# Patient Record
Sex: MALE | Race: WHITE | Employment: OTHER | URBAN - METROPOLITAN AREA
[De-identification: names, ages, dates, MRNs, and addresses within clinical notes are randomized per-mention and may not be internally consistent; named-entity substitution may affect disease eponyms.]

---

## 2017-02-15 PROBLEM — E78.2 MIXED HYPERLIPIDEMIA: Status: ACTIVE | Noted: 2017-02-15

## 2017-02-15 PROBLEM — I10 BENIGN ESSENTIAL HTN: Status: ACTIVE | Noted: 2017-02-15

## 2017-02-15 PROBLEM — Z79.4 CONTROLLED TYPE 2 DIABETES MELLITUS WITH COMPLICATION, WITH LONG-TERM CURRENT USE OF INSULIN (HCC): Status: ACTIVE | Noted: 2017-02-15

## 2017-02-15 PROBLEM — Z95.1 S/P CABG (CORONARY ARTERY BYPASS GRAFT): Status: ACTIVE | Noted: 2017-02-15

## 2017-02-15 PROBLEM — N18.30 CKD (CHRONIC KIDNEY DISEASE), STAGE III (HCC): Status: ACTIVE | Noted: 2017-02-15

## 2017-02-15 PROBLEM — I25.10 CORONARY ARTERY DISEASE INVOLVING NATIVE CORONARY ARTERY WITHOUT ANGINA PECTORIS: Status: ACTIVE | Noted: 2017-02-15

## 2017-02-15 PROBLEM — E11.8 CONTROLLED TYPE 2 DIABETES MELLITUS WITH COMPLICATION, WITH LONG-TERM CURRENT USE OF INSULIN (HCC): Status: ACTIVE | Noted: 2017-02-15

## 2017-03-01 ENCOUNTER — OFFICE VISIT (OUTPATIENT)
Dept: PULMONOLOGY | Age: 77
End: 2017-03-01

## 2017-03-01 VITALS
WEIGHT: 192.4 LBS | HEIGHT: 73 IN | RESPIRATION RATE: 18 BRPM | HEART RATE: 64 BPM | SYSTOLIC BLOOD PRESSURE: 132 MMHG | DIASTOLIC BLOOD PRESSURE: 74 MMHG | TEMPERATURE: 97.9 F | OXYGEN SATURATION: 91 % | BODY MASS INDEX: 25.5 KG/M2

## 2017-03-01 DIAGNOSIS — J98.11 ATELECTASIS: Primary | ICD-10-CM

## 2017-03-01 DIAGNOSIS — R93.89 ABNORMAL CHEST X-RAY: ICD-10-CM

## 2017-03-01 PROCEDURE — 1123F ACP DISCUSS/DSCN MKR DOCD: CPT | Performed by: NURSE PRACTITIONER

## 2017-03-01 PROCEDURE — 1036F TOBACCO NON-USER: CPT | Performed by: NURSE PRACTITIONER

## 2017-03-01 PROCEDURE — G8598 ASA/ANTIPLAT THER USED: HCPCS | Performed by: NURSE PRACTITIONER

## 2017-03-01 PROCEDURE — G8484 FLU IMMUNIZE NO ADMIN: HCPCS | Performed by: NURSE PRACTITIONER

## 2017-03-01 PROCEDURE — 99212 OFFICE O/P EST SF 10 MIN: CPT | Performed by: NURSE PRACTITIONER

## 2017-03-01 PROCEDURE — 1111F DSCHRG MED/CURRENT MED MERGE: CPT | Performed by: NURSE PRACTITIONER

## 2017-03-01 PROCEDURE — G8420 CALC BMI NORM PARAMETERS: HCPCS | Performed by: NURSE PRACTITIONER

## 2017-03-01 PROCEDURE — G8427 DOCREV CUR MEDS BY ELIG CLIN: HCPCS | Performed by: NURSE PRACTITIONER

## 2017-03-01 PROCEDURE — 4040F PNEUMOC VAC/ADMIN/RCVD: CPT | Performed by: NURSE PRACTITIONER

## 2017-03-16 ENCOUNTER — HOSPITAL ENCOUNTER (OUTPATIENT)
Dept: OTHER | Age: 77
Discharge: HOME | End: 2017-03-16
Attending: NURSE PRACTITIONER | Admitting: NURSE PRACTITIONER

## 2017-03-16 DIAGNOSIS — J98.11 ATELECTASIS: ICD-10-CM

## 2017-03-27 ENCOUNTER — TELEPHONE (OUTPATIENT)
Dept: PULMONOLOGY | Age: 77
End: 2017-03-27

## 2017-03-27 DIAGNOSIS — J98.11 ATELECTASIS: Primary | ICD-10-CM

## 2017-03-30 PROBLEM — R07.9 CHEST PAIN: Status: ACTIVE | Noted: 2017-03-30

## 2017-03-30 PROBLEM — R06.02 SOB (SHORTNESS OF BREATH): Status: ACTIVE | Noted: 2017-03-30

## 2017-04-03 PROBLEM — I26.99 BILATERAL PULMONARY EMBOLISM (HCC): Status: ACTIVE | Noted: 2017-04-03

## 2017-04-18 ENCOUNTER — OFFICE VISIT (OUTPATIENT)
Dept: PULMONOLOGY | Age: 77
End: 2017-04-18

## 2017-04-18 VITALS
SYSTOLIC BLOOD PRESSURE: 128 MMHG | TEMPERATURE: 97.5 F | OXYGEN SATURATION: 92 % | BODY MASS INDEX: 25.05 KG/M2 | DIASTOLIC BLOOD PRESSURE: 62 MMHG | RESPIRATION RATE: 16 BRPM | HEIGHT: 73 IN | HEART RATE: 70 BPM | WEIGHT: 189 LBS

## 2017-04-18 DIAGNOSIS — J96.11 CHRONIC RESPIRATORY FAILURE WITH HYPOXIA (HCC): ICD-10-CM

## 2017-04-18 DIAGNOSIS — J98.11 ATELECTASIS: Primary | ICD-10-CM

## 2017-04-18 DIAGNOSIS — R93.89 ABNORMAL CHEST X-RAY: ICD-10-CM

## 2017-04-18 DIAGNOSIS — I26.99 OTHER ACUTE PULMONARY EMBOLISM WITHOUT ACUTE COR PULMONALE (HCC): ICD-10-CM

## 2017-04-18 PROCEDURE — G8427 DOCREV CUR MEDS BY ELIG CLIN: HCPCS | Performed by: NURSE PRACTITIONER

## 2017-04-18 PROCEDURE — 1123F ACP DISCUSS/DSCN MKR DOCD: CPT | Performed by: NURSE PRACTITIONER

## 2017-04-18 PROCEDURE — 1036F TOBACCO NON-USER: CPT | Performed by: NURSE PRACTITIONER

## 2017-04-18 PROCEDURE — 1111F DSCHRG MED/CURRENT MED MERGE: CPT | Performed by: NURSE PRACTITIONER

## 2017-04-18 PROCEDURE — G8420 CALC BMI NORM PARAMETERS: HCPCS | Performed by: NURSE PRACTITIONER

## 2017-04-18 PROCEDURE — 4040F PNEUMOC VAC/ADMIN/RCVD: CPT | Performed by: NURSE PRACTITIONER

## 2017-04-18 PROCEDURE — G8598 ASA/ANTIPLAT THER USED: HCPCS | Performed by: NURSE PRACTITIONER

## 2017-04-18 PROCEDURE — 99214 OFFICE O/P EST MOD 30 MIN: CPT | Performed by: NURSE PRACTITIONER

## 2017-04-25 ENCOUNTER — HOSPITAL ENCOUNTER (OUTPATIENT)
Dept: CT IMAGING | Age: 77
Discharge: HOME | End: 2017-04-25
Attending: NURSE PRACTITIONER | Admitting: NURSE PRACTITIONER

## 2017-04-25 ENCOUNTER — HOSPITAL ENCOUNTER (OUTPATIENT)
Dept: CARDIAC REHAB | Age: 77
Discharge: HOME | End: 2017-04-25
Attending: INTERNAL MEDICINE | Admitting: INTERNAL MEDICINE

## 2017-04-25 DIAGNOSIS — J98.11 ATELECTASIS: ICD-10-CM

## 2017-04-25 DIAGNOSIS — J96.11 CHRONIC RESPIRATORY FAILURE WITH HYPOXIA (HCC): ICD-10-CM

## 2017-04-25 PROCEDURE — 94620 PR PULMONARY STRESS TESTING,SIMPLE: CPT | Performed by: INTERNAL MEDICINE

## 2017-04-26 ENCOUNTER — TELEPHONE (OUTPATIENT)
Dept: PULMONOLOGY | Age: 77
End: 2017-04-26

## 2017-04-26 PROBLEM — J96.11 CHRONIC RESPIRATORY FAILURE WITH HYPOXIA (HCC): Status: ACTIVE | Noted: 2017-04-26

## 2017-06-09 ENCOUNTER — TELEPHONE (OUTPATIENT)
Dept: PULMONOLOGY | Age: 77
End: 2017-06-09

## 2017-06-15 ENCOUNTER — OFFICE VISIT (OUTPATIENT)
Dept: PULMONOLOGY | Age: 77
End: 2017-06-15

## 2017-06-15 VITALS
HEIGHT: 73 IN | BODY MASS INDEX: 25.45 KG/M2 | OXYGEN SATURATION: 93 % | DIASTOLIC BLOOD PRESSURE: 62 MMHG | RESPIRATION RATE: 16 BRPM | TEMPERATURE: 97.6 F | SYSTOLIC BLOOD PRESSURE: 122 MMHG | WEIGHT: 192 LBS | HEART RATE: 54 BPM

## 2017-06-15 DIAGNOSIS — R91.8 LUNG NODULES: ICD-10-CM

## 2017-06-15 DIAGNOSIS — I26.99 OTHER ACUTE PULMONARY EMBOLISM WITHOUT ACUTE COR PULMONALE (HCC): Primary | ICD-10-CM

## 2017-06-15 DIAGNOSIS — J43.2 CENTRILOBULAR EMPHYSEMA (HCC): ICD-10-CM

## 2017-06-15 PROCEDURE — 99214 OFFICE O/P EST MOD 30 MIN: CPT | Performed by: INTERNAL MEDICINE

## 2017-06-15 PROCEDURE — 4040F PNEUMOC VAC/ADMIN/RCVD: CPT | Performed by: INTERNAL MEDICINE

## 2017-06-15 PROCEDURE — 1123F ACP DISCUSS/DSCN MKR DOCD: CPT | Performed by: INTERNAL MEDICINE

## 2017-06-15 PROCEDURE — G8598 ASA/ANTIPLAT THER USED: HCPCS | Performed by: INTERNAL MEDICINE

## 2017-06-15 PROCEDURE — 3023F SPIROM DOC REV: CPT | Performed by: INTERNAL MEDICINE

## 2017-06-15 PROCEDURE — G8427 DOCREV CUR MEDS BY ELIG CLIN: HCPCS | Performed by: INTERNAL MEDICINE

## 2017-06-15 PROCEDURE — 1036F TOBACCO NON-USER: CPT | Performed by: INTERNAL MEDICINE

## 2017-06-15 PROCEDURE — G8419 CALC BMI OUT NRM PARAM NOF/U: HCPCS | Performed by: INTERNAL MEDICINE

## 2017-06-15 PROCEDURE — G8926 SPIRO NO PERF OR DOC: HCPCS | Performed by: INTERNAL MEDICINE

## 2017-12-19 ENCOUNTER — OFFICE VISIT (OUTPATIENT)
Dept: PULMONOLOGY | Age: 77
End: 2017-12-19

## 2017-12-19 VITALS
HEART RATE: 64 BPM | OXYGEN SATURATION: 94 % | DIASTOLIC BLOOD PRESSURE: 62 MMHG | RESPIRATION RATE: 16 BRPM | HEIGHT: 73 IN | TEMPERATURE: 97.4 F | SYSTOLIC BLOOD PRESSURE: 130 MMHG | WEIGHT: 195 LBS | BODY MASS INDEX: 25.84 KG/M2

## 2017-12-19 DIAGNOSIS — I26.99 OTHER ACUTE PULMONARY EMBOLISM WITHOUT ACUTE COR PULMONALE (HCC): ICD-10-CM

## 2017-12-19 DIAGNOSIS — J43.2 CENTRILOBULAR EMPHYSEMA (HCC): Primary | ICD-10-CM

## 2017-12-19 DIAGNOSIS — R91.8 LUNG NODULES: ICD-10-CM

## 2017-12-19 PROCEDURE — 3023F SPIROM DOC REV: CPT | Performed by: INTERNAL MEDICINE

## 2017-12-19 PROCEDURE — 4040F PNEUMOC VAC/ADMIN/RCVD: CPT | Performed by: INTERNAL MEDICINE

## 2017-12-19 PROCEDURE — G8598 ASA/ANTIPLAT THER USED: HCPCS | Performed by: INTERNAL MEDICINE

## 2017-12-19 PROCEDURE — G8926 SPIRO NO PERF OR DOC: HCPCS | Performed by: INTERNAL MEDICINE

## 2017-12-19 PROCEDURE — 99213 OFFICE O/P EST LOW 20 MIN: CPT | Performed by: INTERNAL MEDICINE

## 2017-12-19 PROCEDURE — 1123F ACP DISCUSS/DSCN MKR DOCD: CPT | Performed by: INTERNAL MEDICINE

## 2017-12-19 PROCEDURE — G8427 DOCREV CUR MEDS BY ELIG CLIN: HCPCS | Performed by: INTERNAL MEDICINE

## 2017-12-19 PROCEDURE — G8484 FLU IMMUNIZE NO ADMIN: HCPCS | Performed by: INTERNAL MEDICINE

## 2017-12-19 PROCEDURE — 1036F TOBACCO NON-USER: CPT | Performed by: INTERNAL MEDICINE

## 2017-12-19 PROCEDURE — G8417 CALC BMI ABV UP PARAM F/U: HCPCS | Performed by: INTERNAL MEDICINE

## 2017-12-19 RX ORDER — ALBUTEROL SULFATE 90 UG/1
2 AEROSOL, METERED RESPIRATORY (INHALATION) EVERY 6 HOURS PRN
Qty: 1 INHALER | Refills: 0 | COMMUNITY
Start: 2017-12-19

## 2017-12-19 NOTE — PROGRESS NOTES
Chief complaint  This is a 68y.o. year old male  who presents with a chief complaint of   Chief Complaint   Patient presents with    COPD       HPI  66-year-old man with emphysema and pulmonary emboli presents for follow-up. He reports that [place] He has some mild shortness of breath with this, but this does not limit him. He also does the yard work. He has a rare cough. He used a sample of Anoro, but did not feel that it made any difference in his breathing.     Past Medical History:   Diagnosis Date    Asthma     Diabetes mellitus ([address]     Hyperlipidemia     Hypertension        Past Surgical History:   Procedure Laterality Date    ARTERIAL BYPASS SURGRY  1999    CORONARY ARTERY BYPASS GRAFT      EYE SURGERY         Current Outpatient Prescriptions   Medication Sig Dispense Refill    albuterol sulfate HFA (PROAIR HFA) 108 (90 Base) MCG/ACT inhaler Inhale 2 puffs into the lungs every 6 hours as needed for Wheezing 1 Inhaler 0    apixaban (ELIQUIS) 5 MG TABS tablet 10mg bid x 6 days, then 5mg bid - future refills of 60 tabs 72 tablet 3    acetaminophen (TYLENOL) 325 MG tablet Take 2 tablets by mouth every 4 hours as needed for Pain or Fever 120 tablet 3    atorvastatin (LIPITOR) 40 MG tablet 1 tablet daily      diltiazem (TIAZAC) 360 MG extended release capsule Take 360 mg by mouth daily       doxazosin (CARDURA) 1 MG tablet Take 1 mg by mouth daily       fenofibrate micronized (LOFIBRA) 134 MG capsule Take 134 mg by mouth daily       insulin detemir (LEVEMIR) 100 UNIT/ML injection vial Inject 30 Units into the skin daily       losartan-hydrochlorothiazide (HYZAAR) 100-12.5 MG per tablet Take 1 tablet by mouth daily       metFORMIN (GLUCOPHAGE) 500 MG tablet Take 500 mg by mouth 2 times daily (with meals)       montelukast (SINGULAIR) 10 MG tablet Take 10 mg by mouth daily       aspirin 81 MG chewable tablet Take 81 mg by mouth daily       Insulin Pen Needle ulcers on visible areas. Normal texture and turgor  Lymphatic: No cervical LAD. No supraclavicular LAD. Musculoskeletal: No cyanosis, clubbing or joint deformity. Psychiatric: Normal mood and affect; exhibits normal insight and judgement        Six minute walk test  4/25/17- Walked 427m, 76% predicted (normal); homer saturation 93%     Images and reports of chest imaging were reviewed by me. My interpretation is:  CXR (3/31): Left base atelectasis  Chest CT (4/25/17): Calcified left hilar node; bi-apical scarring; centrilobular emphysema; nodules in the right upper lobe; right upper lobe granulomas; bilateral lower lobe bronchiectasis; atelectasis in the bilateral lower lobes; granulomas in the left lower lobe and left upper lobe; left lower lobe pleural-based nodule        ECHO (12/4/17- [facility])  Study Conclusions    - Left ventricle: Systolic function was normal. Doppler parameters    are consistent with abnormal left ventricular relaxation (grade 1    diastolic dysfunction). The global longitudinal strain was -17%. - Aortic valve: Valve area (VTI): 1.7cm^2. Valve area (Vmax):    1.31cm^2. - Mitral valve: The annulus was mildly calcified. The leaflets were    mildly thickened. Valve area by continuity equation (using LVOT    flow): 1.7cm^2.  - Left atrium: The atrium was markedly dilated. - Right atrium: The atrium was dilated. Lab Results   Component Value Date    WBC 9.1 04/03/2017    HGB 14.1 04/03/2017    HCT 43.4 04/03/2017    MCV 88.7 04/03/2017     04/03/2017       No results found for: BNP    Lab Results   Component Value Date    CREATININE 1.7 (H) 04/03/2017    BUN 28 (H) 04/03/2017     04/03/2017    K 4.8 04/03/2017     04/03/2017    CO2 23 04/03/2017         Assessment/Plan:68y.o. year old male presents for follow up. COPD- Has emphysema on chest CT. He has no significant respiratory symptoms.  He was given a sample of an albuterol inhaler to be used as needed. Lung nodules- Has bilateral pulmonary nodules. Will repeat chest CT in April 2018 (one year follow up). Pulmonary emboli- On Eliquis per hematology. He will return for follow-up in 4 months.       Pipo Lynn MD  [facility]

## 2018-01-01 ENCOUNTER — OFFICE VISIT (OUTPATIENT)
Dept: PULMONOLOGY | Age: 78
End: 2018-01-01
Payer: MEDICARE

## 2018-01-01 VITALS
RESPIRATION RATE: 16 BRPM | OXYGEN SATURATION: 95 % | HEART RATE: 86 BPM | TEMPERATURE: 97.7 F | WEIGHT: 194 LBS | BODY MASS INDEX: 25.71 KG/M2 | SYSTOLIC BLOOD PRESSURE: 142 MMHG | DIASTOLIC BLOOD PRESSURE: 70 MMHG | HEIGHT: 73 IN

## 2018-01-01 DIAGNOSIS — R91.8 LUNG NODULES: Primary | ICD-10-CM

## 2018-01-01 DIAGNOSIS — J43.2 CENTRILOBULAR EMPHYSEMA (HCC): ICD-10-CM

## 2018-01-01 PROCEDURE — G8598 ASA/ANTIPLAT THER USED: HCPCS | Performed by: INTERNAL MEDICINE

## 2018-01-01 PROCEDURE — G8482 FLU IMMUNIZE ORDER/ADMIN: HCPCS | Performed by: INTERNAL MEDICINE

## 2018-01-01 PROCEDURE — 3023F SPIROM DOC REV: CPT | Performed by: INTERNAL MEDICINE

## 2018-01-01 PROCEDURE — 4040F PNEUMOC VAC/ADMIN/RCVD: CPT | Performed by: INTERNAL MEDICINE

## 2018-01-01 PROCEDURE — 1036F TOBACCO NON-USER: CPT | Performed by: INTERNAL MEDICINE

## 2018-01-01 PROCEDURE — 1101F PT FALLS ASSESS-DOCD LE1/YR: CPT | Performed by: INTERNAL MEDICINE

## 2018-01-01 PROCEDURE — 99213 OFFICE O/P EST LOW 20 MIN: CPT | Performed by: INTERNAL MEDICINE

## 2018-01-01 PROCEDURE — G8427 DOCREV CUR MEDS BY ELIG CLIN: HCPCS | Performed by: INTERNAL MEDICINE

## 2018-01-01 PROCEDURE — 1123F ACP DISCUSS/DSCN MKR DOCD: CPT | Performed by: INTERNAL MEDICINE

## 2018-01-01 PROCEDURE — G8926 SPIRO NO PERF OR DOC: HCPCS | Performed by: INTERNAL MEDICINE

## 2018-01-01 PROCEDURE — G8417 CALC BMI ABV UP PARAM F/U: HCPCS | Performed by: INTERNAL MEDICINE

## 2018-01-09 ENCOUNTER — HOSPITAL ENCOUNTER (OUTPATIENT)
Dept: CT IMAGING | Age: 78
Discharge: HOME | End: 2018-01-09
Attending: INTERNAL MEDICINE | Admitting: INTERNAL MEDICINE

## 2018-01-09 DIAGNOSIS — R91.8 LUNG NODULES: ICD-10-CM

## 2018-01-09 DIAGNOSIS — R91.8 OTHER NONSPECIFIC ABNORMAL FINDING OF LUNG FIELD (CODE): ICD-10-CM

## 2018-04-23 ENCOUNTER — OFFICE VISIT (OUTPATIENT)
Dept: PULMONOLOGY | Age: 78
End: 2018-04-23

## 2018-04-23 VITALS
WEIGHT: 195.6 LBS | RESPIRATION RATE: 16 BRPM | SYSTOLIC BLOOD PRESSURE: 134 MMHG | TEMPERATURE: 97.1 F | BODY MASS INDEX: 25.92 KG/M2 | DIASTOLIC BLOOD PRESSURE: 68 MMHG | OXYGEN SATURATION: 95 % | HEIGHT: 73 IN | HEART RATE: 68 BPM

## 2018-04-23 DIAGNOSIS — J43.2 CENTRILOBULAR EMPHYSEMA (HCC): ICD-10-CM

## 2018-04-23 DIAGNOSIS — R91.8 LUNG NODULES: Primary | ICD-10-CM

## 2018-04-23 PROCEDURE — G8598 ASA/ANTIPLAT THER USED: HCPCS | Performed by: INTERNAL MEDICINE

## 2018-04-23 PROCEDURE — 4040F PNEUMOC VAC/ADMIN/RCVD: CPT | Performed by: INTERNAL MEDICINE

## 2018-04-23 PROCEDURE — G8427 DOCREV CUR MEDS BY ELIG CLIN: HCPCS | Performed by: INTERNAL MEDICINE

## 2018-04-23 PROCEDURE — G8417 CALC BMI ABV UP PARAM F/U: HCPCS | Performed by: INTERNAL MEDICINE

## 2018-04-23 PROCEDURE — 99213 OFFICE O/P EST LOW 20 MIN: CPT | Performed by: INTERNAL MEDICINE

## 2018-04-23 PROCEDURE — 1036F TOBACCO NON-USER: CPT | Performed by: INTERNAL MEDICINE

## 2018-04-23 PROCEDURE — 3023F SPIROM DOC REV: CPT | Performed by: INTERNAL MEDICINE

## 2018-04-23 PROCEDURE — G8926 SPIRO NO PERF OR DOC: HCPCS | Performed by: INTERNAL MEDICINE

## 2018-04-23 PROCEDURE — 1123F ACP DISCUSS/DSCN MKR DOCD: CPT | Performed by: INTERNAL MEDICINE

## 2018-11-12 NOTE — PROGRESS NOTES
mouth daily       Insulin Pen Needle (B-D UF III MINI PEN NEEDLES) 31G X 5 MM MISC USE EVERY DAY      NEEDLE, DISP, 30 G 30G X 1\" MISC daily.  Omega-3 Fatty Acids (FISH OIL) 500 MG CAPS Take 500 mg by mouth daily        No current facility-administered medications for this visit. No Known Allergies    Family History   Problem Relation Age of Onset    Urolithiasis Mother     Heart Attack Father        Social History     Social History    Marital status:      Spouse name: N/A    Number of children: N/A    Years of education: N/A     Occupational History    Not on file. Social History Main Topics    Smoking status: Former Smoker     Packs/day: 1.00     Years: 40.00     Types: Cigarettes     Start date: 3/1/1957     Quit date: 6/1/1999    Smokeless tobacco: Never Used    Alcohol use No    Drug use: No    Sexual activity: Not Currently     Other Topics Concern    Not on file     Social History Narrative    No narrative on file   Smoked 1-1.5 packs per day x~40 years. [facility]   Administered Date(s) Administered    Influenza Vaccine, unspecified formulation 09/09/2015, 10/10/2016    Influenza, High Dose (Fluzone 65 yrs and older) 09/30/2017, 09/28/2018    Pneumococcal Vaccine, unspecified formulation 10/10/2016       ROS:  GENERAL:  No fevers, no chills, no weight loss or gain  RESPIRATORY:  No shortness of breath, no wheezing, no cough      PHYSICAL EXAM:  Vitals:    11/12/18 0747 11/12/18 0808   BP: (!) 166/70 (!) 142/70   Site: Left Upper Arm Left Upper Arm   Position: Sitting Sitting   Cuff Size: Medium Adult Medium Adult   Pulse: 86    Resp: 16    Temp: 97.7 °F (36.5 °C)    TempSrc: Oral    SpO2: 95%    Weight: 194 lb (88 kg)    Height: 6' 1\" (1.854 m)    on RA    Gen: Well developed; well nourished  Eyes: No scleral icterus. No conjunctival injection. ENT:  Oropharynx clear. External appearance of ears and nose normal.  Neck: Trachea midline.  No atrium was markedly dilated. - Right atrium: The atrium was dilated. Lab Results   Component Value Date    WBC 9.1 04/03/2017    HGB 14.1 04/03/2017    HCT 43.4 04/03/2017    MCV 88.7 04/03/2017     04/03/2017       No results found for: BNP    Lab Results   Component Value Date    CREATININE 1.7 (H) 04/03/2017    BUN 28 (H) 04/03/2017     04/03/2017    K 4.8 04/03/2017     04/03/2017    CO2 23 04/03/2017         Assessment/Plan:66y.o. year old male presents for follow up. Lung nodules- Has bilateral pulmonary nodules which have been stable between April 2017 and January 2018. Will obtain repeat chest CT in April 2019 to complete 2 years of surveillance. COPD- Has emphysema on chest CT. Continue albuterol inhaler as needed. Pulmonary embolism- He has completed one year of Eliquis . He is scheduled to follow up with his hematologist soon. He will return for follow-up in 6 months.       Bin Salgado MD  [facility], [facility] Sleep

## 2019-01-01 ENCOUNTER — HOSPITAL ENCOUNTER (OUTPATIENT)
Dept: CT IMAGING | Age: 79
Discharge: HOME | End: 2019-03-23
Payer: MEDICARE

## 2019-01-01 ENCOUNTER — HOSPITAL ENCOUNTER (EMERGENCY)
Age: 79
End: 2019-04-26
Attending: EMERGENCY MEDICINE
Payer: MEDICARE

## 2019-01-01 VITALS — WEIGHT: 188.71 LBS | BODY MASS INDEX: 24.9 KG/M2

## 2019-01-01 DIAGNOSIS — R91.8 LUNG NODULES: ICD-10-CM
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
## 2019-01-01 DIAGNOSIS — I46.9 CARDIAC ARREST (HCC): Primary | ICD-10-CM

## 2019-01-01 PROCEDURE — 4500000025 HC ED LEVEL 5 PROCEDURE

## 2019-01-01 PROCEDURE — 82962 GLUCOSE BLOOD TEST: CPT

## 2019-01-01 PROCEDURE — 92950 HEART/LUNG RESUSCITATION CPR: CPT

## 2019-01-01 PROCEDURE — 96374 THER/PROPH/DIAG INJ IV PUSH: CPT

## 2019-01-01 PROCEDURE — 6360000002 HC RX W HCPCS: Performed by: EMERGENCY MEDICINE

## 2019-01-01 PROCEDURE — 99285 EMERGENCY DEPT VISIT HI MDM: CPT

## 2019-01-01 PROCEDURE — 2580000003 HC RX 258: Performed by: EMERGENCY MEDICINE

## 2019-01-01 PROCEDURE — 96375 TX/PRO/DX INJ NEW DRUG ADDON: CPT

## 2019-01-01 PROCEDURE — 71250 CT THORAX DX C-: CPT

## 2019-01-01 RX ORDER — SODIUM CHLORIDE 9 MG/ML
INJECTION, SOLUTION INTRAVENOUS CONTINUOUS PRN
Status: COMPLETED | OUTPATIENT
Start: 2019-01-01 | End: 2019-01-01

## 2019-01-01 RX ADMIN — SODIUM CHLORIDE 999 ML/HR: 9 INJECTION, SOLUTION INTRAVENOUS at 18:17

## 2019-01-01 RX ADMIN — EPINEPHRINE 1 MG: 0.1 INJECTION INTRACARDIAC; INTRAVENOUS at 18:17

## 2019-04-26 NOTE — ED NOTES
Left a message for Dr. Joseph Gonzalez to return call for notification of death.   [phone]      Hernando Lopez, JONI  04/26/19 24[address] [facility], RN  04/26/19 [address] [place] [address], JONI  04/26/19 [id number]
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
## 2019-04-27 NOTE — ED NOTES
Felix hickman  home notified that the family is ready for him to be picked up     Arlet Glynn RN  199

## 2019-04-27 NOTE — ED NOTES
Family and clergy at bedside      [facility], [facility]  04/26/19 2050

## 2019-04-29 LAB
GLUCOSE BLD-MCNC: 208 MG/DL (ref 70–99)
PERFORMED ON: ABNORMAL